# Patient Record
Sex: MALE | Race: WHITE | NOT HISPANIC OR LATINO
[De-identification: names, ages, dates, MRNs, and addresses within clinical notes are randomized per-mention and may not be internally consistent; named-entity substitution may affect disease eponyms.]

---

## 2024-01-22 ENCOUNTER — APPOINTMENT (OUTPATIENT)
Dept: ORTHOPEDIC SURGERY | Facility: CLINIC | Age: 59
End: 2024-01-22
Payer: COMMERCIAL

## 2024-01-22 VITALS — BODY MASS INDEX: 28.63 KG/M2 | HEIGHT: 70 IN | WEIGHT: 200 LBS

## 2024-01-22 DIAGNOSIS — Z78.9 OTHER SPECIFIED HEALTH STATUS: ICD-10-CM

## 2024-01-22 DIAGNOSIS — Z86.59 PERSONAL HISTORY OF OTHER MENTAL AND BEHAVIORAL DISORDERS: ICD-10-CM

## 2024-01-22 PROBLEM — Z00.00 ENCOUNTER FOR PREVENTIVE HEALTH EXAMINATION: Status: ACTIVE | Noted: 2024-01-22

## 2024-01-22 PROCEDURE — 72040 X-RAY EXAM NECK SPINE 2-3 VW: CPT

## 2024-01-22 PROCEDURE — 99204 OFFICE O/P NEW MOD 45 MIN: CPT

## 2024-01-22 RX ORDER — METHYLPREDNISOLONE 4 MG/1
4 TABLET ORAL
Qty: 1 | Refills: 0 | Status: ACTIVE | COMMUNITY
Start: 2024-01-22 | End: 1900-01-01

## 2024-01-22 RX ORDER — BUPROPION HYDROCHLORIDE 300 MG/1
300 TABLET, EXTENDED RELEASE ORAL
Refills: 0 | Status: ACTIVE | COMMUNITY

## 2024-01-22 RX ORDER — ESCITALOPRAM OXALATE 10 MG/1
10 TABLET, FILM COATED ORAL
Refills: 0 | Status: ACTIVE | COMMUNITY

## 2024-01-23 NOTE — PHYSICAL EXAM
[Normal Coordination] : normal coordination [Normal DTR UE/LE] : normal DTR UE/LE  [Normal Mood and Affect] : normal mood and affect [Normal Sensation] : normal sensation [Orientated] : orientated [Able to Communicate] : able to communicate [Normal Skin] : normal skin [No Rash] : no rash [No Lesions] : no lesions [No Ulcers] : no ulcers [No obvious lymphadenopathy in areas examined] : no obvious lymphadenopathy in areas examined [Well Developed] : well developed [Peripheral vascular exam is grossly normal] : peripheral vascular exam is grossly normal [No Respiratory Distress] : no respiratory distress [Normal Bowel Sounds] : normal bowel sounds [Lungs clear to auscultation bilaterally] : lungs clear to auscultation bilaterally [No Mass] : no mass [No HSM] : no HSM [Non-Tender] : non-tender [NL (80)] : right lateral rotation 80 degrees [NL (45)] : right lateral flexion 45 degrees [4___] : right triceps 4[unfilled]/5 [5___] : right grasp 5[unfilled]/5 [Biceps 2+] : biceps 2+ [Triceps 2+] : triceps 2+ [Brachioradialis 2+] : brachioradialis 2+ [] : motor exam is not 5/5 throughout both upper extremities with normal tone [3___] : right triceps 3[unfilled]/5

## 2024-01-23 NOTE — DISCUSSION/SUMMARY
[de-identified] : 59 y/o M with cervical radiculopathy, h/o ACDF C6/7 approx. 6-7 years ago with Dr. Pineda.  Xray today reveals solid fusion C6/7 with bone spurring. I am prescribing the patient MDP for pain relief. Titration schedule provided.  Resume NSAID after completing MDP. Discussed home exercise program.  I am requesting a cervical spine MRI to evaluate for adjacent segment stenosis vs hnp, h/o ACDF 6/7 yrs ago, patient experiencing persistent b/l UE radic and right triceps weakness(3/5). Discussed possible interventional spine injections vs surgical decompression/fusion dependent upon pathology found on MRI and response to conservative management.  Follow up after MRI for further treatment plan.   Prior to appointment and during encounter with patient extensive medical records were reviewed including but not limited to, hospital records, outpatient records, imaging results, and lab data.During this appointment the patient was examined, diagnoses were discussed and explained in a face to face manner. In addition extensive time was spent reviewing aforementioned diagnostic studies. Counseling including abnormal image results, differential diagnoses, treatment options, risk and benefits, lifestyle changes, current condition, and current medications was performed. Patient's comments, questions, and concerns were addressed and patient verbalized understanding. Based on this patient's presentation at our office, which is an orthopedic spine surgeon's office, this patient inherently / intrinsically has a risk, however minute, of developing issues such as Cauda equina syndrome, bowel and bladder changes, or progression of motor or neurological deficits such as paralysis which may be permanent.  ALYCIA LOVE Acting as a Scribe for Dr. Pineda I, Alycia Love, attest that this documentation has been prepared under the direction and in the presence of Provider Kole Pineda MD.

## 2024-01-23 NOTE — HISTORY OF PRESENT ILLNESS
[7] : 7 [2] : 2 [Burning] : burning [Radiating] : radiating [Sharp] : sharp [Constant] : constant [Standing] : standing [Full time] : Work status: full time [de-identified] : 01/22/2024 - 58-year-old male is presenting for an initial evaluation with the chief complaint of left > right scapula pain and intermittent radiating left triceps pain. He acknowledges occasional right upper extremity pain. He underwent a C6/7 spinal fusion six to seven years ago with Dr. Pineda. Although the prior surgery was tremendously successful in improving his pre-operative symptoms, the present pain is described as having a similar character but not reaching the same severity as before the operation. He is managing the current pain with 800 mg Naproxen. General medical health is good.         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

## 2024-02-03 ENCOUNTER — APPOINTMENT (OUTPATIENT)
Dept: MRI IMAGING | Facility: CLINIC | Age: 59
End: 2024-02-03
Payer: COMMERCIAL

## 2024-02-03 PROCEDURE — 72141 MRI NECK SPINE W/O DYE: CPT

## 2024-02-09 ENCOUNTER — APPOINTMENT (OUTPATIENT)
Dept: ORTHOPEDIC SURGERY | Facility: CLINIC | Age: 59
End: 2024-02-09

## 2024-02-19 ENCOUNTER — APPOINTMENT (OUTPATIENT)
Dept: ORTHOPEDIC SURGERY | Facility: CLINIC | Age: 59
End: 2024-02-19
Payer: COMMERCIAL

## 2024-02-19 VITALS — HEIGHT: 70 IN | WEIGHT: 200 LBS | BODY MASS INDEX: 28.63 KG/M2

## 2024-02-19 DIAGNOSIS — Z78.9 OTHER SPECIFIED HEALTH STATUS: ICD-10-CM

## 2024-02-19 DIAGNOSIS — M54.12 RADICULOPATHY, CERVICAL REGION: ICD-10-CM

## 2024-02-19 PROCEDURE — 99214 OFFICE O/P EST MOD 30 MIN: CPT

## 2024-02-21 PROBLEM — M54.12 CERVICAL RADICULOPATHY AT C7: Status: ACTIVE | Noted: 2024-01-22

## 2024-02-21 NOTE — PHYSICAL EXAM
[Normal Coordination] : normal coordination [Normal DTR UE/LE] : normal DTR UE/LE  [Normal Sensation] : normal sensation [Normal Mood and Affect] : normal mood and affect [Oriented] : oriented [Able to Communicate] : able to communicate [Normal Skin] : normal skin [No Rash] : no rash [No Ulcers] : no ulcers [No Lesions] : no lesions [No obvious lymphadenopathy in areas examined] : no obvious lymphadenopathy in areas examined [Well Developed] : well developed [Peripheral vascular exam is grossly normal] : peripheral vascular exam is grossly normal [No Respiratory Distress] : no respiratory distress [NL (45)] : right lateral flexion 45 degrees [NL (80)] : right lateral rotation 80 degrees [3___] : right triceps 3[unfilled]/5 [5___] : right grasp 5[unfilled]/5 [Biceps 2+] : biceps 2+ [Triceps 2+] : triceps 2+ [Brachioradialis 2+] : brachioradialis 2+ [] : motor exam is not 5/5 throughout both upper extremities with normal tone

## 2024-02-21 NOTE — DISCUSSION/SUMMARY
[de-identified] : 57 y/o M with cervical radiculopathy, h/o ACDF C6/7 approx. 6-7 years ago with Dr. Pineda.  MRI -  C4/5 C5/6 disc protrusions. sig left sided neuroforaminal stenosis C3/4. C4/5 and C5/6 mod b/l neuroforaminal stenosis. C6/7 patent spinal canal s/p decompressive surgery.  The patient is asymptomatic in the office today and denies radicular pain. If there is a return of arm pain, we discussed the option of referring to pain management for Cervical Epidural Steroid Injections (ADRIANO). Surgery is considered a last resort, and it will be avoided for now. In the meantime, the patient was counseled on stretching exercises. Follow-up visit is as needed.  Prior to appointment and during encounter with patient extensive medical records were reviewed including but not limited to, hospital records, outpatient records, imaging results, and lab data.During this appointment the patient was examined, diagnoses were discussed and explained in a face to face manner. In addition extensive time was spent reviewing aforementioned diagnostic studies. Counseling including abnormal image results, differential diagnoses, treatment options, risk and benefits, lifestyle changes, current condition, and current medications was performed. Patient's comments, questions, and concerns were addressed and patient verbalized understanding. Based on this patient's presentation at our office, which is an orthopedic spine surgeon's office, this patient inherently / intrinsically has a risk, however minute, of developing issues such as Cauda equina syndrome, bowel and bladder changes, or progression of motor or neurological deficits such as paralysis which may be permanent.  ALYCIA LOVE Acting as a Scribe for Dr. Edwin GREGORY, Alycia Love, attest that this documentation has been prepared under the direction and in the presence of Provider Kole Pineda MD.

## 2024-02-21 NOTE — HISTORY OF PRESENT ILLNESS
[Neck] : neck [1] : 2 [0] : 0 [Localized] : localized [Meds] : meds [Standing] : standing [Walking] : walking [Full time] : Work status: full time [de-identified] : 02/19/2024 - Patient presenting for MRI Review. Severity of pain is significantly improved and he has been asymptomatic since last night. Denies any radiating pectoral pain, or any pain in general present day.   01/22/2024 - 58-year-old male is presenting for an initial evaluation with the chief complaint of left > right scapula pain and intermittent radiating left triceps pain. He acknowledges occasional right upper extremity pain. He underwent a C6/7 spinal fusion six to seven years ago with Dr. Pineda. Although the prior surgery was tremendously successful in improving his pre-operative symptoms, the present pain is described as having a similar character but not reaching the same severity as before the operation. He is managing the current pain with 800 mg Naproxen. General medical health is good.         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